# Patient Record
Sex: FEMALE | URBAN - METROPOLITAN AREA
[De-identification: names, ages, dates, MRNs, and addresses within clinical notes are randomized per-mention and may not be internally consistent; named-entity substitution may affect disease eponyms.]

---

## 2021-04-06 ENCOUNTER — TRANSFERRED RECORDS (OUTPATIENT)
Dept: HEALTH INFORMATION MANAGEMENT | Facility: CLINIC | Age: 26
End: 2021-04-06

## 2021-05-06 ENCOUNTER — PRE VISIT (OUTPATIENT)
Dept: UROLOGY | Facility: CLINIC | Age: 26
End: 2021-05-06

## 2021-05-06 NOTE — TELEPHONE ENCOUNTER
PREVISIT INFORMATION                                                    Andrea Cornell scheduled for future visit at [facility] specialty clinics.    Patient is scheduled to see Susu Villanueva CNP on 5/17/2021  Reason for visit: fetal bilateral pyelectasis  Referring provider : Jeannette Snider MD  Has patient seen previous specialist? No  Medical Records:  Available in chart.  Patient was previously seen at a [facility] or [facility] facility.    REVIEW                                                      New patient packet mailed to patient: No  Medication reconciliation complete: No      No current outpatient medications on file.       Allergies: Patient has no allergy information on record.        PLAN/FOLLOW-UP NEEDED                                                      Previsit review complete.  Patient will see provider at future scheduled appointment.     Patient Reminders Given:  Please, make sure you bring an updated list of your medications.   If you are having a procedure, please, present 15 minutes early.  If you need to cancel or reschedule,please call [phone].    Dalila Stein MA

## 2021-05-17 ENCOUNTER — VIRTUAL VISIT (OUTPATIENT)
Dept: UROLOGY | Facility: CLINIC | Age: 26
End: 2021-05-17
Payer: COMMERCIAL

## 2021-05-17 DIAGNOSIS — O35.EXX0 PYELECTASIS OF FETUS ON PRENATAL ULTRASOUND: Primary | ICD-10-CM

## 2021-05-17 PROCEDURE — 99203 OFFICE O/P NEW LOW 30 MIN: CPT | Mod: TEL | Performed by: NURSE PRACTITIONER

## 2021-05-17 NOTE — PATIENT INSTRUCTIONS
Plan:  Prophylactic antibiotics are sometimes recommended for babies with hydronephrosis.  I do not recommend starting baby on prophylactics right away after birth.  This is something we may discuss if he/she develops UTIs, or if there are concerning features on  ultrasounds.      After your baby is born and you have gone home from the hospital, please call the RN Care Coordinator (Vale Jackson) at [facility] in [place] and give her your baby s name and date of birth.  She will help coordinate the ultrasound and visit that needs to be done about 4 weeks after birth.    Phone number to call: [phone]    If your baby develops a fever >101.4 without a clear localizing source or other concerning symptoms such as intractable pain or vomiting, please bring him to your local clinic for evaluation with a catheterized urine specimen if there is concern for UTI.          [facility]   Department of Pediatric Urology  MD Oleg Lamar, STEF Gilliam RN     [facility] [phone] - Nurse Practitioner appointments   [phone] - RN Care Coordinator     Urology Office:    [phone] - fax     [place] [phone]    [place] [phone]    [place] scheduling    [phone]     Surgery Scheduling:   [place]   [phone]

## 2021-05-17 NOTE — PROGRESS NOTES
Andrea Cornell is a 25 year old female who is being evaluated via a billable telephone visit.      How would you like to obtain your AVS? Mail a copy    Andrea Cornell complains of    Chief Complaint   Patient presents with     Telephone     New patient consult telephone visit        Patient is located in [place]? Yes     I have reviewed and updated the patient's medication list, allergies and preferred pharmacy.    Zakiya Dudley LPN        No primary care provider on file.  No primary provider on file.    RE:  Andrea Cornell  :  1995  [place] MRN:  [id number]  Date of visit:  May 17, 2021        Dear Colleague:    I had the pleasure of speaking to your patient, Andrea, today via telephone visit in consultation for the question of bilateral fetal pyelectasis.  Please see below the details of this visit and my impression and plans discussed with the family.        CC:  Bilateral fetal pyelectasis     HPI:  Andrea is a 25 year old woman whom I was asked to see in consultation for the above.  I am speaking to Andrea today via telephone visit.  Our visit today is aided by the assistance of a [employer]  via telephone.  This is Andrea's 2nd pregnancy.  The sex of the fetus is male.  At the 23 week ultrasound it was noted that there was mild dilation of the right renal pelvis (UTD A1) and moderate dilation of the left renal pelvis with dilation of the calyces (UTD A2-3); parenchyma of normal echogenicity and thickness.  This was re-demonstrated at the 32 week ultrasound.  So far the pregnancy has been going well; she has been getting more swollen toward the end of this pregnancy.  Andrea is planning to deliver at [facility].  There is no known family history of genitourinary disorders in childhood.      PMH:  History reviewed. No pertinent past medical history.    PSH:   History reviewed. No pertinent surgical history.    Meds and allergies reviewed and confirmed in our EMR.    ROS:   Pertinent positives mentioned in the HPI.    PE:  There were no vitals taken for this visit.  There is no height or weight on file to calculate BMI.  Physical exam was not performed as this was a telephone visit.       Impression:        Diagnoses       Codes Comments    Pyelectasis of fetus on prenatal ultrasound    -  Primary O35.8XX0            Plan:    We discussed the likely prenatal causes for this, including prenatal obstructive issues that have already resolved versus those that may need surgical help with resolution in childhood, as well as the possibility of vesicoureteral reflux.  We discussed the risks for urinary tract infection, and the signs and symptoms to be aware of.  We will NOT plan to start baby on antibiotics for UTI prophylaxis after birth.  We made our plans for follow-up with me after the baby is born with renal/bladder ultrasound around 4 weeks of age.  I addressed all questions and would encourage a phone call from their MFM if there are any future concerns during the pregnancy.      Telephone encounter duration:  15 minutes    I spent a total of 30 minutes on the date of encounter doing chart review, history and exam, documentation, and further activities as noted above.       Thank you very much for allowing me the opportunity to participate in this nice family's care with you.    Sincerely,    TOPHER Paris, CPNP  Pediatric Urology, [facility]

## 2021-05-17 NOTE — LETTER
2021       RE: Andrea Cornell  [address] [facility] [address]     Dear Colleague,    Thank you for referring your patient, Andrea Cornell, to the [facility] [place] at [facility]. Please see a copy of my visit note below.    Andrea Cornell is a 25 year old female who is being evaluated via a billable telephone visit.      How would you like to obtain your AVS? Mail a copy    Andrea Cornell complains of    Chief Complaint   Patient presents with     Telephone     New patient consult telephone visit        Patient is located in [place]? Yes     I have reviewed and updated the patient's medication list, allergies and preferred pharmacy.    Zakiya Dudley LPN        No primary care provider on file.  No primary provider on file.    RE:  Andrea Cornell  :  1995  [place] MRN:  [id number]  Date of visit:  May 17, 2021        Dear Colleague:    I had the pleasure of speaking to your patient, Andrea, today via telephone visit in consultation for the question of bilateral fetal pyelectasis.  Please see below the details of this visit and my impression and plans discussed with the family.        CC:  Bilateral fetal pyelectasis     HPI:  Andrea is a 25 year old woman whom I was asked to see in consultation for the above.  I am speaking to Andrea today via telephone visit.  Our visit today is aided by the assistance of a [employer]  via telephone.  This is Andrea's 2nd pregnancy.  The sex of the fetus is male.  At the 23 week ultrasound it was noted that there was mild dilation of the right renal pelvis (UTD A1) and moderate dilation of the left renal pelvis with dilation of the calyces (UTD A2-3); parenchyma of normal echogenicity and thickness.  This was re-demonstrated at the 32 week ultrasound.  So far the pregnancy has been going well; she has been getting more swollen toward the end of this pregnancy.  Andrea  is planning to deliver at [facility].  There is no known family history of genitourinary disorders in childhood.      PMH:  History reviewed. No pertinent past medical history.    PSH:   History reviewed. No pertinent surgical history.    Meds and allergies reviewed and confirmed in our EMR.    ROS:  Pertinent positives mentioned in the HPI.    PE:  There were no vitals taken for this visit.  There is no height or weight on file to calculate BMI.  Physical exam was not performed as this was a telephone visit.       Impression:        Diagnoses       Codes Comments    Pyelectasis of fetus on prenatal ultrasound    -  Primary O35.8XX0            Plan:    We discussed the likely prenatal causes for this, including prenatal obstructive issues that have already resolved versus those that may need surgical help with resolution in childhood, as well as the possibility of vesicoureteral reflux.  We discussed the risks for urinary tract infection, and the signs and symptoms to be aware of.  We will NOT plan to start baby on antibiotics for UTI prophylaxis after birth.  We made our plans for follow-up with me after the baby is born with renal/bladder ultrasound around 4 weeks of age.  I addressed all questions and would encourage a phone call from their MFM if there are any future concerns during the pregnancy.      Telephone encounter duration:  15 minutes    I spent a total of 30 minutes on the date of encounter doing chart review, history and exam, documentation, and further activities as noted above.       Thank you very much for allowing me the opportunity to participate in this nice family's care with you.    Sincerely,    TOPHER Paris, THONG  Pediatric Urology, [facility]        Again, thank you for allowing me to participate in the care of your patient.      Sincerely,    TOPHER Anne CNP

## 2023-10-03 ENCOUNTER — LAB REQUISITION (OUTPATIENT)
Dept: LAB | Facility: CLINIC | Age: 28
End: 2023-10-03

## 2023-10-03 DIAGNOSIS — N91.1 SECONDARY AMENORRHEA: ICD-10-CM

## 2023-10-03 LAB
ESTRADIOL SERPL-MCNC: 63 PG/ML
PROGEST SERPL-MCNC: 0.4 NG/ML
PROLACTIN SERPL 3RD IS-MCNC: 14 NG/ML (ref 5–23)
T4 FREE SERPL-MCNC: 1.2 NG/DL (ref 0.9–1.7)
TSH SERPL DL<=0.005 MIU/L-ACNC: 4.41 UIU/ML (ref 0.3–4.2)

## 2023-10-03 PROCEDURE — 84443 ASSAY THYROID STIM HORMONE: CPT | Performed by: NURSE PRACTITIONER

## 2023-10-03 PROCEDURE — 84144 ASSAY OF PROGESTERONE: CPT | Performed by: NURSE PRACTITIONER

## 2023-10-03 PROCEDURE — 84146 ASSAY OF PROLACTIN: CPT | Performed by: NURSE PRACTITIONER

## 2023-10-03 PROCEDURE — 82670 ASSAY OF TOTAL ESTRADIOL: CPT | Performed by: NURSE PRACTITIONER

## 2023-10-03 PROCEDURE — 84439 ASSAY OF FREE THYROXINE: CPT | Performed by: NURSE PRACTITIONER
